# Patient Record
Sex: FEMALE | URBAN - NONMETROPOLITAN AREA
[De-identification: names, ages, dates, MRNs, and addresses within clinical notes are randomized per-mention and may not be internally consistent; named-entity substitution may affect disease eponyms.]

---

## 2023-11-02 ENCOUNTER — APPOINTMENT (RX ONLY)
Dept: URBAN - NONMETROPOLITAN AREA CLINIC 15 | Facility: CLINIC | Age: 63
Setting detail: DERMATOLOGY
End: 2023-11-02

## 2023-11-02 DIAGNOSIS — L57.8 OTHER SKIN CHANGES DUE TO CHRONIC EXPOSURE TO NONIONIZING RADIATION: ICD-10-CM

## 2023-11-02 DIAGNOSIS — L30.4 ERYTHEMA INTERTRIGO: ICD-10-CM

## 2023-11-02 DIAGNOSIS — D18.0 HEMANGIOMA: ICD-10-CM

## 2023-11-02 DIAGNOSIS — D22 MELANOCYTIC NEVI: ICD-10-CM

## 2023-11-02 DIAGNOSIS — L82.1 OTHER SEBORRHEIC KERATOSIS: ICD-10-CM

## 2023-11-02 DIAGNOSIS — B35.3 TINEA PEDIS: ICD-10-CM

## 2023-11-02 PROBLEM — D18.01 HEMANGIOMA OF SKIN AND SUBCUTANEOUS TISSUE: Status: ACTIVE | Noted: 2023-11-02

## 2023-11-02 PROBLEM — D22.5 MELANOCYTIC NEVI OF TRUNK: Status: ACTIVE | Noted: 2023-11-02

## 2023-11-02 PROCEDURE — ? FULL BODY SKIN EXAM

## 2023-11-02 PROCEDURE — ? COUNSELING

## 2023-11-02 PROCEDURE — ? OTHER

## 2023-11-02 PROCEDURE — ? TREATMENT REGIMEN

## 2023-11-02 PROCEDURE — ? PRESCRIPTION

## 2023-11-02 PROCEDURE — 99203 OFFICE O/P NEW LOW 30 MIN: CPT

## 2023-11-02 RX ORDER — NYSTATIN AND TRIAMCINOLONE ACETONIDE 100000; 1 [USP'U]/G; MG/G
OINTMENT TOPICAL
Qty: 60 | Refills: 3 | Status: ERX | COMMUNITY
Start: 2023-11-02

## 2023-11-02 RX ORDER — ECONAZOLE NITRATE 10 MG/G
CREAM TOPICAL
Qty: 85 | Refills: 10 | Status: ERX | COMMUNITY
Start: 2023-11-02

## 2023-11-02 RX ADMIN — ECONAZOLE NITRATE: 10 CREAM TOPICAL at 00:00

## 2023-11-02 RX ADMIN — NYSTATIN AND TRIAMCINOLONE ACETONIDE: 100000; 1 OINTMENT TOPICAL at 00:00

## 2023-11-02 ASSESSMENT — LOCATION DETAILED DESCRIPTION DERM
LOCATION DETAILED: NASAL DORSUM
LOCATION DETAILED: PERIUMBILICAL SKIN
LOCATION DETAILED: RIGHT MEDIAL PLANTAR HEEL
LOCATION DETAILED: LEFT MEDIAL BREAST 7-8:00 REGION
LOCATION DETAILED: RIGHT SUPRAPUBIC SKIN
LOCATION DETAILED: LEFT ANTERIOR PROXIMAL THIGH
LOCATION DETAILED: LEFT MEDIAL PLANTAR HEEL
LOCATION DETAILED: INFERIOR THORACIC SPINE
LOCATION DETAILED: SUPERIOR LUMBAR SPINE
LOCATION DETAILED: RIGHT ANTERIOR PROXIMAL THIGH
LOCATION DETAILED: RIGHT MEDIAL BREAST 5-6:00 REGION

## 2023-11-02 ASSESSMENT — LOCATION SIMPLE DESCRIPTION DERM
LOCATION SIMPLE: LEFT PLANTAR SURFACE
LOCATION SIMPLE: LOWER BACK
LOCATION SIMPLE: LEFT THIGH
LOCATION SIMPLE: RIGHT PLANTAR SURFACE
LOCATION SIMPLE: ABDOMEN
LOCATION SIMPLE: RIGHT BREAST
LOCATION SIMPLE: NOSE
LOCATION SIMPLE: GROIN
LOCATION SIMPLE: LEFT BREAST
LOCATION SIMPLE: RIGHT THIGH
LOCATION SIMPLE: UPPER BACK

## 2023-11-02 ASSESSMENT — LOCATION ZONE DERM
LOCATION ZONE: NOSE
LOCATION ZONE: LEG
LOCATION ZONE: FEET
LOCATION ZONE: TRUNK

## 2023-11-02 NOTE — PROCEDURE: TREATMENT REGIMEN
Otc Regimen: Recommended SPF 30 or greater.
Detail Level: Simple
Plan: Recommended desitin as a barrier, advised pt to just use the steroid for a week or 2 and then desitin as a preventative
Initiate Treatment: nystatin-triamcinolone 100,000 unit/gram-0.1 % topical ointment \\nQuantity: 60.0 g  Days Supply: 30\\nSig: Apply to rash BID x 2 weeks max, prn
Initiate Treatment: econazole 1 % topical cream \\nQuantity: 85.0 g  Days Supply: 30\\nSig: Apply to feet BID for 2 weeks, then once weekly for prevention

## 2024-05-12 NOTE — HPI: EVALUATION OF SKIN LESION(S)
What Type Of Note Output Would You Prefer (Optional)?: Standard Output
Hpi Title: Evaluation of Skin Lesions
How Severe Are Your Spot(S)?: mild
Have Your Spot(S) Been Treated In The Past?: has not been treated
details…
responds to pain/responds to verbal commands